# Patient Record
Sex: MALE | Race: WHITE
[De-identification: names, ages, dates, MRNs, and addresses within clinical notes are randomized per-mention and may not be internally consistent; named-entity substitution may affect disease eponyms.]

---

## 2018-06-20 ENCOUNTER — HOSPITAL ENCOUNTER (OUTPATIENT)
Dept: HOSPITAL 47 - PEDOP | Age: 48
Discharge: HOME | End: 2018-06-20
Payer: COMMERCIAL

## 2018-06-20 DIAGNOSIS — Z01.812: Primary | ICD-10-CM

## 2018-06-20 PROCEDURE — 99212 OFFICE O/P EST SF 10 MIN: CPT

## 2018-06-20 PROCEDURE — 87070 CULTURE OTHR SPECIMN AEROBIC: CPT

## 2018-06-27 ENCOUNTER — HOSPITAL ENCOUNTER (OUTPATIENT)
Dept: HOSPITAL 47 - OR | Age: 48
LOS: 1 days | Discharge: HOME HEALTH | End: 2018-06-28
Payer: COMMERCIAL

## 2018-06-27 VITALS — BODY MASS INDEX: 41.1 KG/M2

## 2018-06-27 VITALS — RESPIRATION RATE: 16 BRPM

## 2018-06-27 DIAGNOSIS — Z87.442: ICD-10-CM

## 2018-06-27 DIAGNOSIS — E78.00: ICD-10-CM

## 2018-06-27 DIAGNOSIS — I95.81: ICD-10-CM

## 2018-06-27 DIAGNOSIS — M17.12: Primary | ICD-10-CM

## 2018-06-27 DIAGNOSIS — E11.9: ICD-10-CM

## 2018-06-27 DIAGNOSIS — Z79.899: ICD-10-CM

## 2018-06-27 DIAGNOSIS — I10: ICD-10-CM

## 2018-06-27 DIAGNOSIS — Z79.84: ICD-10-CM

## 2018-06-27 DIAGNOSIS — Z79.1: ICD-10-CM

## 2018-06-27 DIAGNOSIS — E78.5: ICD-10-CM

## 2018-06-27 LAB
GLUCOSE BLD-MCNC: 104 MG/DL (ref 75–99)
GLUCOSE BLD-MCNC: 163 MG/DL (ref 75–99)

## 2018-06-27 PROCEDURE — 85025 COMPLETE CBC W/AUTO DIFF WBC: CPT

## 2018-06-27 PROCEDURE — 97161 PT EVAL LOW COMPLEX 20 MIN: CPT

## 2018-06-27 PROCEDURE — 88300 SURGICAL PATH GROSS: CPT

## 2018-06-27 PROCEDURE — 83036 HEMOGLOBIN GLYCOSYLATED A1C: CPT

## 2018-06-27 PROCEDURE — 27442 REVISION OF KNEE JOINT: CPT

## 2018-06-27 PROCEDURE — 73560 X-RAY EXAM OF KNEE 1 OR 2: CPT

## 2018-06-27 RX ADMIN — HYDROMORPHONE HYDROCHLORIDE PRN MG: 1 INJECTION, SOLUTION INTRAMUSCULAR; INTRAVENOUS; SUBCUTANEOUS at 17:21

## 2018-06-27 RX ADMIN — CEFAZOLIN SCH GM: 10 INJECTION, POWDER, FOR SOLUTION INTRAVENOUS at 23:16

## 2018-06-27 RX ADMIN — POTASSIUM CHLORIDE ONE MLS: 14.9 INJECTION, SOLUTION INTRAVENOUS at 13:44

## 2018-06-27 RX ADMIN — HYDROMORPHONE HYDROCHLORIDE PRN MG: 1 INJECTION, SOLUTION INTRAMUSCULAR; INTRAVENOUS; SUBCUTANEOUS at 16:55

## 2018-06-27 RX ADMIN — CEFAZOLIN SCH MLS/HR: 330 INJECTION, POWDER, FOR SOLUTION INTRAMUSCULAR; INTRAVENOUS at 21:25

## 2018-06-27 RX ADMIN — POTASSIUM CHLORIDE SCH MEQ: 750 TABLET, EXTENDED RELEASE ORAL at 21:13

## 2018-06-27 RX ADMIN — HYDROMORPHONE HYDROCHLORIDE PRN MG: 1 INJECTION, SOLUTION INTRAMUSCULAR; INTRAVENOUS; SUBCUTANEOUS at 16:47

## 2018-06-27 RX ADMIN — MIDAZOLAM ONE MG: 1 INJECTION INTRAMUSCULAR; INTRAVENOUS at 13:49

## 2018-06-27 RX ADMIN — INSULIN ASPART SCH UNIT: 100 INJECTION, SOLUTION INTRAVENOUS; SUBCUTANEOUS at 21:12

## 2018-06-27 RX ADMIN — MIDAZOLAM ONE MG: 1 INJECTION INTRAMUSCULAR; INTRAVENOUS at 13:48

## 2018-06-27 RX ADMIN — POTASSIUM CHLORIDE ONE: 14.9 INJECTION, SOLUTION INTRAVENOUS at 17:10

## 2018-06-27 NOTE — CONS
CONSULTATION



The reason for consultation is advice regarding diabetes, hypertension and

hyperlipidemia requested by Orthopedic Surgery.



HISTORY OF PRESENT ILLNESS:

This 47-year-old gentleman with a past history of hypertension, diabetes and DJD being

followed by Dr. Rufus Barreto in the outpatient was admitted after left total knee

arthroplasty.  The patient is being closely monitored.  There is no history of chest

pain.  No palpitations, headache, loss of consciousness, seizures, nausea, vomiting or

diarrhea at this time.  Pulse ox 92% on room air.



PAST MEDICAL HISTORY:

Diabetes mellitus, hypertension, hyperlipidemia, history of DJD.



MEDICATIONS:

Prior to admission include home medications:

1. Metformin 500 mg p.o. b.i.d.

2. Norvasc 10 mg daily.

3. Triamterene/hydrochlorothiazide 1 tablet p.o. daily.

4. Klor-Con 10 30 mg p.o. b.i.d.

5. Multivitamins 1 p.o. daily.

6. HydroDIURIL 25 mg p.o. daily.

7. Glucosamine 1500 mg p.o. daily.

8. Fish oil 1 each daily.

9. Lodine  mg p.o. daily.

10.Lipitor 40 mg daily.

11.Zovirax 1 application topically daily.



ALLERGIES:

None.



FAMILY HISTORY:

History of blood disorder, DVT, factor V Leiden deficiency.



SOCIAL HISTORY:

No history of smoking, occasional alcohol.



REVIEW OF SYSTEMS:

ENT: No diminished vision or hearing.

CARDIOVASCULAR: No angina.

RESPIRATORY:  No cough or hemoptysis.

GI: No nausea.

:  No dysuria.

NERVOUS SYSTEM: No numbness or weakness.

ALLERGY/IMMUNOLOGY: No asthma or hayfever.

MUSCULOSKELETAL:  As mentioned earlier.

HEMATOLOGY: No history of anemia.

ENDOCRINE: Diabetes mellitus.

CONSTITUTIONAL:  As mentioned.

DERMATOLOGY: Negative.

RHEUMATOLOGY: As mentioned earlier.

PSYCHIATRY: As mentioned earlier.



PHYSICAL EXAMINATION:

Alert and oriented x3.  Pulse 87, blood pressure 180/71, respiration 16, temperature

97.8, pulse ox 94% on room air.

HEENT:  Conjunctivae normal.  Oral mucosa moist.  Neck is no jugular venous distention.

No carotid bruit. No lymph node enlargement.

CARDIOVASCULAR:  S1, S2.  No S3, S4.

RESPIRATORY: Breath sounds diminished in the bases.  No rhonchi.  No crackles.

ABDOMEN:  Soft, nontender.

LEGS:  Status post left knee arthroplasty.

NERVOUS SYSTEM:  No focal deficits.

SKIN:  No ulcer, rash or bleeding.



LAB STUDIES:

Glucose 163.



ASSESSMENT:

1. Status post left total knee joint arthroplasty.

2. Diabetes mellitus type 2.

3. Hypertension.

4. Hyperlipidemia.

5. Degenerative joint disease.

6. History of nephrolithiasis.

7. History of factor V Leiden tested negative.

8. FULL CODE.



RECOMMENDATIONS AND DISCUSSION:

In this 47-year-old gentleman who presented with multiple complex medical issues, we

will monitor the patient closely.  Continue the current management and continue

symptomatic treatment.  At this time I recommend to resume the home medications.

Monitor blood sugars closely.  Xarelto has been initiated for DVT prophylaxis.  We will

closely follow the patient with you and the patient may be asked to follow the primary

physician closely.



Thank you, Dr. Munguia, for letting us participate.





GAEL / ALISHA: [id number] / Job#: [id number]

## 2018-06-27 NOTE — XR
PROCEDURE: XR knee limited LT 2 views

DATE AND TIME: 6/27/2018 4:57 PM

 

REFERRING PHYSICIAN: Dhaval Box MD

 

CLINICAL INDICATION: [facility], Evaluation for Postop abnormality and alignment

 

TECHNIQUE: Department protocol.

 

COMPARISON: None

 

FINDINGS: Post TKR, with anatomic positioning and alignment noted. No unexpected postoperative change
s.

 

IMPRESSION:

Postoperative study.

## 2018-06-27 NOTE — HP
HISTORY AND PHYSICAL



CHIEF COMPLAINT:

Left knee pain.



HISTORY OF PRESENT ILLNESS:

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 
progressive

left knee pain worsening over the past several years.  He notes pain that 
limits his

normal function and activities.  He has intermittent swelling, stiffness, and 
giving

way.  He has tried previous injections in addition to medications with only 
partial

temporary relief.



PAST MEDICAL HISTORY:

Significant for hypertension, hypercholesterolemia, kidney stones, type 2 
diabetes.



PAST SURGICAL HISTORY:

Significant for previous right knee arthroscopy.



CURRENT MEDICATIONS:

1. Amlodipine.

2. Atorvastatin.

3. etolodac

4. Hydrochlorothiazide.

5. Metformin.

6. Trazodone.



ALLERGIES:

He denies drug allergies.



FAMILY HISTORY:

Significant for cancer and diabetes.



SOCIAL HISTORY:

Negative for current tobacco or alcohol use.



REVIEW OF SYSTEMS:

Sixteen-point review of systems otherwise reviewed and is noncontributory.



PHYSICAL EXAMINATION:

On examination, the patient is approximately 5 feet, 11 inches, 295 pounds of

endomorphic habitus.  HEENT exam is nonfocal.  Neck is supple. He has painless 
passive

motion of his left hip.  Straight leg raise is negative.  Active motion left 
knee -12

to 80 degrees of flexion.  He is tender about the medial and lateral joint 
line.  He

has a large effusion.  Collaterals are stable, Lachman's negative, Irlanda's is

equivocal.  His distal neurovascular exam appears intact in the left lower 
extremity.



Previous x-rays of the left knee obtained in the office show severe medial and

patellofemoral compartment narrowing.



IMPRESSION:

1. Left knee severe medial and patellofemoral compartment osteoarthrosis.

2. Increased body mass index.

3. Non-insulin-dependent diabetes.



RECOMMENDATIONS:

I talked to the patient at length regarding his condition and treatment 
options.  At

this point, he remains quite symptomatic despite extensive conservative 
measures.

After thorough discussion, he opts to proceed with surgery.  We will plan to 
proceed

with left total knee arthroplasty.  Risks and benefits were discussed at length 
in

layman's terms.  The patient underwent preoperative medical evaluation by Dr. Eric Barreto.  We will institute DVT prophylaxis postoperatively.





MMODL / IJN: [id number] / Job#: [id number]

MAUREEN

## 2018-06-27 NOTE — P.OP
Date of Procedure: 06/27/18


Preoperative Diagnosis: 


Left knee severe tricompartmental osteoarthrosis


Postoperative Diagnosis: 


Same


Procedure(s) Performed: 


Left total knee arthroplasty-cemented-posterior stabilized


Implants: 


Depuy Attune size 7 cemented posterior stabilized femoral component, size 6 

cemented tibial component, 9 mm articular surface, 35 mm cemented patellar 

component.  This is a posterior stabilized implant.


Anesthesia: GETA, regional, local, spinal


Surgeon: Dhaval Box


Estimated Blood Loss (ml): 50


Pathology: other (Bone fragments)


Condition: stable


Disposition: PACU


Indications for Procedure: 


The patient's a 47-year-old male who presents with progressive left knee pain 

secondary to osteoarthrosis despite conservative measures.  A discussion of the 

risks and benefits of operative intervention versus continued conservative 

measures was made with the patient.  He opted to proceed with surgery.  

Operative risks to include infection, neurovascular injury, development of 

blood clots, possible component loosening, possible component failure need for 

subsequent procedures was discussed.  Informed consent was obtained.


Operative Findings: 


As below


Description of Procedure: 


The patient was brought to the operating room, and after induction of spinal 

anesthesia the left lower extremity was prepped and draped in a normal fashion.

  The tourniquet was inflated to 270 mmHg.  A longitudinal incision extending 3 

finger breaths above the superior pole of patella extending to the medial 

aspect the tibial tubercle was then made.  The skin and subcutaneous tissues 

were divided sharply.  Electrocautery was used for hemostasis.  A medial 

parapatellar arthrotomy is performed.  The medial soft tissues to include the 

superficial and deep portions of the medial collateral ligament and medial 

hamstring tendons were elevated subperiosteally.  The proximal medial tibia 

osteophytes were carefully removed.  The patella was everted.  A portion of the 

retropatellar fat pad was excised sharply.  The knee was flexed.  The anterior 

cruciate ligament was sacrificed.  A starting hole was made in the distal femur 

1 cm anterior to the posterior cruciate ligament origin.  An intramedullary 

femoral guide was gently inserted planning a 5 valgus distal cut with 9 mm 

distal resection.  The cutting block was pinned in place.  The distal cut was 

then made.  The posterior referencing sizing guide was utilized.  I felt size 7 

was most appropriate.  3 of external rotation was built into the system and 

verified off the trans-epicondylar axis the posterior condyles.  The cutting 

block was pinned in place.  The anterior, posterior, and chamfer cuts were then 

made.  The bone fragments were removed.  The box guide was utilized for the 

intercondylar notch cut.  A reciprocating saw was utilized for the bone was 

removed in one fragment.  The trial size 7 femoral components placed and was 

fully seated.  There is good anterior to posterior and medial to lateral fit.  

The distal peg holes were then drilled.  The trial component was removed.  

Attention was then paid towards preparing the proximal tibia.  An extra measure 

tibial guide was utilized in line with the tibial shaft and second metatarsal 

distally.  A 0 posterior slope cutting block was utilized.  I planned on 2 mm 

resection from the medial compartment.  The cutting block was pinned in place.  

The proximal tibial cut was made.  The bone was removed in one fragment.  The 

tibia sized most appropriately size 6.  The remnants of the medial and lateral 

menisci were excised at the capsular junction with electrocautery.  The patient'

s anesthesia was converted to general as the spinal was insufficient.  The 

trial components were placed along with a 9 mm articular surface.  I was tight 

in both flexion and extension therefore an additional 2 mm was resected from 

the proximal tibia utilizing the cutting block.  Again the trial components 

were placed and was able to obtain full flexion and extension with good 

stability with varus and valgus stress.  After several flexion and extension 

cycles, the tibial rotation was marked with electrocautery in line with the 

medial one third of the tibial tubercle.  Attention was then paid towards 

preparing the patella.  A patella reamer was utilized taking Sandifer 15 mm of 

bone stock.  A good flush cut was made.  The patella sized most appropriately 

35 mm.  The peg holes were drilled.  The trial components placed.  The knee was 

taken through range of motion.  I had good patellofemoral tracking with no 

hands technique.  The trial components were then removed.  The posterior 

osteophytes off the distal femur were carefully removed with a curved 

osteotome.  The tibia was prepared in the appropriate rotation with appropriate 

drill and keel punch.  The flexion and extension gaps were checked and felt to 

be symmetric.  The posterior soft tissues were injected with ropivacaine.  The 

bony surfaces were prepared with pulsatile lavage and dried.  Additional drill 

holes were made in the proximal medial tibia to facilitate cement 

interdigitation.  The tibial component was then cemented in placed and was 

fully seated.  Excess cement was removed.  The femoral component was cemented 

in place and was fully seated.  Excess cement was removed.  The trial 9 mm 

articular surface was placed and the knee was put in full extension.  The 

patella component was cemented place as well.  After the cement had 

sufficiently hardened, the knee was again taken through range of motion.  Again 

I was able to obtain full flexion and extension with good stability with varus 

and valgus stress.  The trial articular surface was removed and the final one 

inserted.  This was fully seated.  Care was taken to avoid any soft tissue 

interposition.  Pulsatile lavage was again utilized.  The tourniquet was 

deflated the proximal a 70 minutes total tourniquet time.  The medial 

parapatellar arthrotomy was closed with #2 Ethibond suture.  The second dose of 

IV TXA was given.  A deep drain was placed exiting laterally.  The subcu 

tissues reapproximated interrupted 2-0 Vicryl sutures.  The skin was 

reapproximated with 3-0 strata fix suture.  Skin tape and adhesive was applied.

  A sterile dressing was applied.  The patient was awoken from general 

anesthesia and transferred to the recovery room in good condition.  Blood loss 

was estimated at 50 mL.  No complications were incurred.  Sponge and needle 

counts were correct at the end the case.

## 2018-06-27 NOTE — P.ONQ
Anesthesiology Proc Note - PNB





- Peripheral Nerve Block Performed


  ** Left Adductor Canal Infusion


Time Out Performed: Yes


Procedure Start Time: 13:49


Procedure Stop Time: 14:00


Indication: Acute Post-Operative Pain, Requested by physician


Sedation Type: Sedate with meaningful contact maintained


Preparation: Sterile Dressing


Position: Supine


Catheter: Indwelling


Needle Types: On-Q


Needle Size: 100mm (4")


Needle Gauge: 21


Technique: Ultrasound


Injectate: 0.5% Ropivacaine (see comment for volume) (ropi .5% 20cc)


Blood Aspirated: No


Pain Paresthesia on Injection Noted: No


Resistance on Injection: Normal


Events: Uneventful and Well Tolerated

## 2018-06-28 VITALS — HEART RATE: 83 BPM | TEMPERATURE: 97.6 F | SYSTOLIC BLOOD PRESSURE: 120 MMHG | DIASTOLIC BLOOD PRESSURE: 73 MMHG

## 2018-06-28 LAB
BASOPHILS # BLD AUTO: 0 K/UL (ref 0–0.2)
BASOPHILS NFR BLD AUTO: 0 %
EOSINOPHIL # BLD AUTO: 0 K/UL (ref 0–0.7)
EOSINOPHIL NFR BLD AUTO: 0 %
ERYTHROCYTE [DISTWIDTH] IN BLOOD BY AUTOMATED COUNT: 4.22 M/UL (ref 4.3–5.9)
ERYTHROCYTE [DISTWIDTH] IN BLOOD: 14 % (ref 11.5–15.5)
GLUCOSE BLD-MCNC: 132 MG/DL (ref 75–99)
GLUCOSE BLD-MCNC: 145 MG/DL (ref 75–99)
HBA1C MFR BLD: 6.4 % (ref 4–6)
HCT VFR BLD AUTO: 38.2 % (ref 39–53)
HGB BLD-MCNC: 12.6 GM/DL (ref 13–17.5)
LYMPHOCYTES # SPEC AUTO: 1.2 K/UL (ref 1–4.8)
LYMPHOCYTES NFR SPEC AUTO: 9 %
MCH RBC QN AUTO: 29.8 PG (ref 25–35)
MCHC RBC AUTO-ENTMCNC: 32.9 G/DL (ref 31–37)
MCV RBC AUTO: 90.5 FL (ref 80–100)
MONOCYTES # BLD AUTO: 0.7 K/UL (ref 0–1)
MONOCYTES NFR BLD AUTO: 6 %
NEUTROPHILS # BLD AUTO: 10.6 K/UL (ref 1.3–7.7)
NEUTROPHILS NFR BLD AUTO: 84 %
PLATELET # BLD AUTO: 262 K/UL (ref 150–450)
WBC # BLD AUTO: 12.7 K/UL (ref 3.8–10.6)

## 2018-06-28 RX ADMIN — HYDROCODONE BITARTRATE AND ACETAMINOPHEN PRN EACH: 7.5; 325 TABLET ORAL at 13:29

## 2018-06-28 RX ADMIN — CEFAZOLIN SCH: 330 INJECTION, POWDER, FOR SOLUTION INTRAMUSCULAR; INTRAVENOUS at 13:46

## 2018-06-28 RX ADMIN — HYDROCODONE BITARTRATE AND ACETAMINOPHEN PRN EACH: 7.5; 325 TABLET ORAL at 08:09

## 2018-06-28 RX ADMIN — CEFAZOLIN SCH GM: 10 INJECTION, POWDER, FOR SOLUTION INTRAVENOUS at 08:12

## 2018-06-28 RX ADMIN — POTASSIUM CHLORIDE SCH MEQ: 750 TABLET, EXTENDED RELEASE ORAL at 08:08

## 2018-06-28 RX ADMIN — INSULIN ASPART SCH UNIT: 100 INJECTION, SOLUTION INTRAVENOUS; SUBCUTANEOUS at 12:57

## 2018-06-28 RX ADMIN — INSULIN ASPART SCH UNIT: 100 INJECTION, SOLUTION INTRAVENOUS; SUBCUTANEOUS at 08:11

## 2018-06-28 NOTE — P.PN
Subjective


Progress Note Date: 06/28/18


Principal diagnosis: 





Status post left total knee arthroplasty





Patient is seen today resting in his hospital bed, his family is present with 

him.  Doing very well at this time.  Patient is urinating without difficulty.  

He is done well with physical therapy.  He denies any chest pain or shortness 

of breath.





Objective





- Vital Signs


Vital signs: 


 Vital Signs











Temp  97.6 F   06/28/18 07:44


 


Pulse  83   06/28/18 07:44


 


Resp  16   06/28/18 07:44


 


BP  120/73   06/28/18 07:44


 


Pulse Ox  99   06/28/18 07:44








 Intake & Output











 06/27/18 06/28/18 06/28/18





 18:59 06:59 18:59


 


Intake Total 2001  


 


Output Total 150 600 425


 


Balance 1851 -600 -425


 


Weight 133.81 kg  


 


Intake:   


 


  IV 2001  


 


Output:   


 


  Drainage  150 


 


    Left Knee  150 


 


  Urine 100 450 425


 


    Uretheral (Le)   300


 


  Estimated Blood Loss 50  


 


Other:   


 


  Voiding Method  Indwelling Catheter 














- Exam





Left lower extremity:


Incision is clean, dry, and intact.  The prineo tape is in good condition.  

There is minimal soft tissue swelling and ecchymosis surrounding the medial and 

lateral aspects of the incision.  Calf is soft, no tenderness with palpation.  

Plantar flexion, dorsiflexion, EHL, FHL are intact.  Sensory exam to light 

touch throughout the extremity is intact, dorsal pedis pulses 2+.





- Labs


CBC & Chem 7: 


 06/28/18 06:31





Labs: 


 Abnormal Lab Results - Last 24 Hours (Table)











  06/27/18 06/27/18 06/28/18 Range/Units





  13:28 20:33 06:31 


 


WBC    12.7 H  (3.8-10.6)  k/uL


 


RBC    4.22 L  (4.30-5.90)  m/uL


 


Hgb    12.6 L  (13.0-17.5)  gm/dL


 


Hct    38.2 L  (39.0-53.0)  %


 


Neutrophils #    10.6 H  (1.3-7.7)  k/uL


 


POC Glucose (mg/dL)  104 H  163 H   (75-99)  mg/dL














  06/28/18 06/28/18 Range/Units





  07:19 11:28 


 


WBC    (3.8-10.6)  k/uL


 


RBC    (4.30-5.90)  m/uL


 


Hgb    (13.0-17.5)  gm/dL


 


Hct    (39.0-53.0)  %


 


Neutrophils #    (1.3-7.7)  k/uL


 


POC Glucose (mg/dL)  132 H  145 H  (75-99)  mg/dL














Assessment and Plan


Plan: 





Assessment:


1.  Postop day 1 status post left total knee arthroplasty





Plan:


Pain control, continue use of oral medication at home


GI and DVT prophylaxis, Xarelto 10 mg once daily for 12 days


Wound care instructions were discussed


Icing and elevating techniques were discussed


Medical recommendations


Discharge planning: Patient will be discharged home today


Time with Patient: Less than 30

## 2018-06-28 NOTE — P.PN
Progress Note - Text


The patient is status post left adductor canal catheter placement.  The 

catheter was placed for postoperative pain control, status post total left 

arthroplasty.  Ropivacaine 0.2% is infusing at 8 mLs per hour.  The patient has 

no complaints  of left lower extremity numbness or weakness.  Patient's VAS 

score is 0 -10.   Assessment: Patient's adductor canal catheter is in place and 

working appropriately.  Plan: continue infusion and adjust it as needed.

## 2018-06-28 NOTE — P.DS
Providers


Date of admission: 





06/27/2018


Expected date of discharge: 06/28/18


Attending physician: 


Dhaval Box





Consults: 





 





06/27/18 16:33


Consult Physician Routine 


   Consulting Provider: Hardy Beckett


   Consult Reason/Comments: postop management


   Do you want consulting provider notified?: Yes











Primary care physician: 


Eric MAST Estevan





[place] [facility] Course: 





Date of admission: 06/27/2018


Date of discharge: 06/28/2018





Admission diagnosis: Status post left total knee arthroplasty


Discharge diagnosis: Same





Attending physician: Dr. Box





Surgical procedures: Left total knee arthroplasty





Brief history: Patient is a 47-year-old male with a history of with progressive 

primary left knee osteoarthritis.  At this point patient has failed 

conservative treatment measures and has opted to proceed with a elective left 

total knee arthroplasty.





Hospital course: Details of patient's surgery can be found in operative report.

  Patient tolerated the procedure well and was subsequently transported to 

orthopedic floor.  Patient's orthopeidc and medical care was provided daily. 

Patient had daily laboratory tests performed for evaluation of overall blood 

counts.  Patient had daily physical therapy to include strengthening range of 

motion as well as education with walker ambulation.  Patient had daily CPM 

usage as part of their physical therapy program.  Patient was treated with 

Xarelto for their postoperative DVT prophylaxis during their inpatient stay.  

Patient was noted to have a relatively uneventful postoperative course.  

Patient reported satisfactory pain control with oral pain medications by 

postoperative day 0.  Patient showed satisfactory progress with physical 

therapy.  Patient moved steadily through the program and had no difficulty 

meeting the goals by postoperative day 1.  Given patient's otherwise 

satisfactory course and having met physical therapy goals, plan is to discharge 

patient home on postoperative day 1.





Discharge condition/disposition: Patient will be discharged home in stable 

condition.





Discharge medications: Instructions are given on resumption of patient's normal 

daily medications per primary care recommendation, in addition patient will be 

prescribed Norco 7.5 mg/325 mg, tramadol 50 mg, Colace 100 mg, Xarelto 10 mg.





Discharge instructions:


1.  Wound care and infection precautions, keep incision dry and covered while 

showering, no lotions, creams, moisturizers. No soaking, tubs, pools, hottubs. 

Do not scrub over the incision.


2.  Weight-bear [as tolerated] with walker / cane until follow-up.


3.  Ice and elevate when necessary.  Do not exceed 20 minutes per hour with ice 

pack.


4.  Utilize compression sleeve until seen at first follow up appointment.


5.  Visiting nursing care.


6.  Home physical therapy [including home CPM].


7.  Pain meds and anticoagulants per prescription.


8.  Pain medication has potential to cause constipation. Increase oral fluid 

and fiber intake. Contact primary care provider if you have not had a bowel 

movement within 48 hours after discharge


9.  No anti-inflammatory medication until discussed at first post operative 

visit, this including Motrin, Aleve, Mobic, Diclofenac.


10.  Follow up in office at 2 weeks postop with Chavo Flores PA-C


11. Follow up with your primary care doctor 7-10 days after discharge.


12. Contact [facility] with any questions, [phone].








Procedures: 





Left total knee arthroplasty


Patient Condition at Discharge: Good





Plan - Discharge Summary


Discharge Rx Participant: Yes


New Discharge Prescriptions: 


New


   Rivaroxaban [Xarelto] 10 mg PO DAILY #12 tab


   Docusate [Colace] 100 mg PO DAILY #30 capsule


   HYDROcodone/APAP 7.5-325MG [Norco 7.5] 1 - 2 each PO Q6HR PRN #56 tab


     PRN Reason: Pain


   traMADol HCl [Ultram] 50 mg PO Q6H PRN #28 tab


     PRN Reason: Pain





No Action


   Etodolac [Lodine XR] 500 mg PO DAILY


   Acyclovir 5% Oint [Zovirax Oint] 1 applic TOPICAL 5XD PRN


     PRN Reason: Cold Sores


   amLODIPine [Norvasc] 10 mg PO DAILY


   metFORMIN HCL [Glucophage] 500 mg PO BID


   Hydrochlorothiazide [Hydrodiuril] 25 mg PO DAILY


   Glucosamine Sulfate 1,500 mg PO DAILY


   Atorvastatin [Lipitor] 40 mg PO DAILY


   Triamterene/Hydrochlorothiazid [Triamterene-Hctz 37.5-25 mg Tb] 1 each PO 

DAILY


   Potassium Chloride [Klor-Con 10] 30 meq PO BID


   Multivitamin [Men's Multi-Vitamin] 1 each PO DAILY


   Fish Oil/Dha/Epa [Fish Oil 1,200 mg Fish Oil] 1 each PO DAILY


Discharge Medication List





Acyclovir 5% Oint [Zovirax Oint] 1 applic TOPICAL 5XD PRN 06/19/18 [History]


Atorvastatin [Lipitor] 40 mg PO DAILY 06/19/18 [History]


Etodolac [Lodine XR] 500 mg PO DAILY 06/19/18 [History]


Fish Oil/Dha/Epa [Fish Oil 1,200 mg Fish Oil] 1 each PO DAILY 06/19/18 [History]


Glucosamine Sulfate 1,500 mg PO DAILY 06/19/18 [History]


Hydrochlorothiazide [Hydrodiuril] 25 mg PO DAILY 06/19/18 [History]


Multivitamin [Men's Multi-Vitamin] 1 each PO DAILY 06/19/18 [History]


Potassium Chloride [Klor-Con 10] 30 meq PO BID 06/19/18 [History]


Triamterene/Hydrochlorothiazid [Triamterene-Hctz 37.5-25 mg Tb] 1 each PO DAILY 

06/19/18 [History]


amLODIPine [Norvasc] 10 mg PO DAILY 06/19/18 [History]


metFORMIN HCL [Glucophage] 500 mg PO BID 06/19/18 [History]


Docusate [Colace] 100 mg PO DAILY #30 capsule 06/28/18 [Rx]


HYDROcodone/APAP 7.5-325MG [Norco 7.5] 1 - 2 each PO Q6HR PRN #56 tab 06/28/18 [

Rx]


Rivaroxaban [Xarelto] 10 mg PO DAILY #12 tab 06/28/18 [Rx]


traMADol HCl [Ultram] 50 mg PO Q6H PRN #28 tab 06/28/18 [Rx]








Follow up Appointment(s)/Referral(s): 


Deric Flores PAC [PHYSICIAN ASSISTANT] - 2 Weeks


Eric Barreto MD [Primary Care Provider] - 07/13/18 2:30 pm


Patient Instructions/Handouts:  *Surgery [facility] - On-Q Pain Pump Discharge 

Instructions, Knee Replacement (DC)


Activity/Diet/Wound Care/Special Instructions: 


[facility] care: [phone]


[facility] - [phone] - Please call once home to arrange delivery of 

CPM


Orthopedic Discharge Instructions:


1.  Wound care and infection precautions, keep incision dry and covered while 

showering, no lotions, creams, moisturizers. No soaking, pools, hot tubs. Do 

not scrub over incision.


2.  Weight-bear as tolerated with walker / cane until follow-up.


3.  Ice and elevate when necessary.  Do not exceed 20 minutes per hour with ice 

pack.


4.  Utilize compression sleeve until seen at first follow up appointment.


5.  Visiting nursing care.


6.  Home physical therapy including home CPM.


7.  Pain meds and anticoagulants per prescription.


8.  Pain medication has potential to cause constipation. Increase oral fluid 

and fiber intake. Contact primary care provider if you have not had a bowel 

movement within 48 hours after discharge.


9.  No anti-inflammatory medication until discussed at first post operative 

visit, this including Motrin, Aleve, Mobic, Diclofenac.


10. Follow up in office at 2 weeks postop with Chavo Flores PA-C


11. Follow up with your primary care doctor 7-10 days after discharge.


12. Contact [facility] with any questions, [phone].


Discharge Disposition: HOME WITH HOME HEALTH SERVICES

## 2018-06-28 NOTE — P.PN
Subjective


No overnight events blood pressure is well controlled.








Objective





- Vital Signs


Vital signs: 


 Vital Signs











Temp  97.6 F   06/28/18 07:44


 


Pulse  83   06/28/18 07:44


 


Resp  16   06/28/18 07:44


 


BP  120/73   06/28/18 07:44


 


Pulse Ox  99   06/28/18 07:44








 Intake & Output











 06/27/18 06/28/18 06/28/18





 18:59 06:59 18:59


 


Intake Total 2001  


 


Output Total 


 


Balance 1851 -600 -1475


 


Weight 133.81 kg  


 


Intake:   


 


  IV 2001  


 


Output:   


 


  Drainage  150 


 


    Left Knee  150 


 


  Urine 


 


    Uretheral (Le)   300


 


  Estimated Blood Loss 50  


 


Other:   


 


  Voiding Method  Indwelling Catheter 














- Exam


PHYSICAL EXAMINATION: 





GENERAL: The patient is alert and oriented x3, not in any acute distress. Well 

developed, well nourished. 


HEENT: Pupils are round and equally reacting to light. EOMI. No scleral 

icterus. No conjunctival pallor. Normocephalic, atraumatic. No pharyngeal 

erythema. No thyromegaly. 


CARDIOVASCULAR: S1 and S2 present. No murmurs, rubs, or gallops. 


PULMONARY: Chest is clear to auscultation, no wheezing or crackles. 


ABDOMEN: Soft, nontender, nondistended, normoactive bowel sounds. No palpable 

organomegaly. 


MUSCULOSKELETAL: No joint swelling or deformity.


EXTREMITIES: Deferred to orthopedic surgery


NEUROLOGICAL: Gross neurological examination did not reveal any focal deficits. 


SKIN: No rashes. 








- Labs


CBC & Chem 7: 


 06/28/18 06:31





Labs: 


 Abnormal Lab Results - Last 24 Hours (Table)











  06/27/18 06/28/18 06/28/18 Range/Units





  20:33 06:31 06:31 


 


WBC    12.7 H  (3.8-10.6)  k/uL


 


RBC    4.22 L  (4.30-5.90)  m/uL


 


Hgb    12.6 L  (13.0-17.5)  gm/dL


 


Hct    38.2 L  (39.0-53.0)  %


 


Neutrophils #    10.6 H  (1.3-7.7)  k/uL


 


POC Glucose (mg/dL)  163 H    (75-99)  mg/dL


 


Hemoglobin A1c   6.4 H   (4.0-6.0)  %














  06/28/18 06/28/18 Range/Units





  07:19 11:28 


 


WBC    (3.8-10.6)  k/uL


 


RBC    (4.30-5.90)  m/uL


 


Hgb    (13.0-17.5)  gm/dL


 


Hct    (39.0-53.0)  %


 


Neutrophils #    (1.3-7.7)  k/uL


 


POC Glucose (mg/dL)  132 H  145 H  (75-99)  mg/dL


 


Hemoglobin A1c    (4.0-6.0)  %














Assessment and Plan


Plan: 


-Status post left knee arthroplasty pain management due to prophylaxis as per 

primary service


-Type 2 diabetes mellitus patient is resumed on home medications, blood sugars 

appear to be well-controlled patient will be resumed and continued same 

medications at home.


-Hypertension patient had of hypotension post surgery presently doing okay can 

continue his home medications


-Hyperlipidemia


-Leukocytosis secondary to left knee arthroplasty without any signs or symptoms 

of infection.


-Patient's discharge medications were reviewed upon discharge okay to be 

discharged from medical perspective

## 2022-07-26 ENCOUNTER — HOSPITAL ENCOUNTER (OUTPATIENT)
Dept: HOSPITAL 47 - RADCTMAIN | Age: 52
Discharge: HOME | End: 2022-07-26
Attending: INTERNAL MEDICINE
Payer: COMMERCIAL

## 2022-07-26 DIAGNOSIS — Z13.6: Primary | ICD-10-CM

## 2022-07-26 DIAGNOSIS — I25.84: ICD-10-CM

## 2022-07-26 PROCEDURE — 75571 CT HRT W/O DYE W/CA TEST: CPT

## 2022-07-26 NOTE — CT
EXAMINATION TYPE: CT heart w calcium score

 

DATE OF EXAM: 7/26/2022

 

COMPARISON: None

 

HISTORY: Screening for cardiovascular disorder. 213.9

 

CT DLP: 286.6 mGycm

Automated exposure control for dose reduction was used.

 

CT CALCIUM SCORING

Coronary calcium is a marker for plaque (fatty deposits) in a blood vessel or atherosclerosis (harden
ing of the arteries).  The presence and amount of calcium detected in a coronary artery by the CT sca
n, indicates the presence and amount of atherosclerotic plaque.  These calcium deposits appear years 
before the development of heart disease symptoms such as chest pain and shortness of breath.

 

A calcium score is computed for each of the coronary arteries based upon the volume and density of th
e calcium deposits.  This can be referred to as your calcified plaque burden.  It does not correspond
 directly to the percentage of narrowing in the artery but does correlate with the severity of the un
derlying coronary atherosclerosis.

 

PROCEDURE

 

TECHNIQUE - Prospective Gating was used.  Slice thickness: 3mm.

Density threshold (HU): 130, Pixel threshold: 3, Algorithm: discrete.

 

RESULTS

 

Region:  LM

Calcium Score (Agatston): 32.39

Volume (mm3): 33.13

Mass (g): 11.04

 

Region:  RCA

Calcium Score (Agatston): 2323.15

Volume (mm3): 1914.76

Mass (g): 638.25

 

Region:  LAD

Calcium Score (Agatston): 226.74

Volume (mm3): 180.54

Mass (g): 60.18

 

Region:  CX

Calcium Score (Agatston): 0

Volume (mm3): 0

Mass (g): 0

 

Region:  PDA

Calcium Score (Agatston):  0

Volume (mm3):  0

Mass (g):  0

 

Total:

Calcium Score (Agatston):  2582.28

Volume (mm3): 2128.43

Mass (g):  709.48

 

TOTAL CALCIUM SCORE:  2582.28

 

IMPRESSION: 

 

Calcium Score:  401 or higher

 

Implication:  Extensive atherosclerotic plaque

 

Risk of Coronary Artery Disease: High likelihood of at least one significant coronary narrowing.

 

There is calcified coronary plaque. Total calcium score: 2582.28. The hyperdensity of these calcifica
tions are within the RCA. Follow up with primary care provider. Primary prevention for atherosclerosi
s cardiovascular disease as indicated by clinical measures.

 

CALCIUM SCORE    IMPLICATION                                                                RISK OF C
ORONARY ARTERY DISEASE

0                                    No identifiable plaque                                          
          Very low, generally less than 5%

1-10                              Minimal identifiable plaque                                        
    Very unlikely, less than 10%

                         Definite, at least mild atherosclerotic plaque               Mild or m
inimal coronary narrowings likely

101-400                       Definite, at least moderate atherosclerotic plaque     Mild coronary ar
beronica disease highly likely, significant narrowing possible

401 or Higher              Extensive atherosclerotic plaque                                  High lik
elihood of at least one significant coronary narrowing